# Patient Record
Sex: MALE | Race: WHITE
[De-identification: names, ages, dates, MRNs, and addresses within clinical notes are randomized per-mention and may not be internally consistent; named-entity substitution may affect disease eponyms.]

---

## 2018-03-15 ENCOUNTER — APPOINTMENT (OUTPATIENT)
Dept: PULMONOLOGY | Facility: CLINIC | Age: 43
End: 2018-03-15
Payer: COMMERCIAL

## 2018-03-15 VITALS
DIASTOLIC BLOOD PRESSURE: 70 MMHG | HEIGHT: 69 IN | BODY MASS INDEX: 32.58 KG/M2 | SYSTOLIC BLOOD PRESSURE: 120 MMHG | WEIGHT: 220 LBS

## 2018-03-15 VITALS — OXYGEN SATURATION: 95 % | HEART RATE: 71 BPM

## 2018-03-15 DIAGNOSIS — R05 COUGH: ICD-10-CM

## 2018-03-15 DIAGNOSIS — J45.909 UNSPECIFIED ASTHMA, UNCOMPLICATED: ICD-10-CM

## 2018-03-15 DIAGNOSIS — K21.9 GASTRO-ESOPHAGEAL REFLUX DISEASE W/OUT ESOPHAGITIS: ICD-10-CM

## 2018-03-15 DIAGNOSIS — Z82.49 FAMILY HISTORY OF ISCHEMIC HEART DISEASE AND OTHER DISEASES OF THE CIRCULATORY SYSTEM: ICD-10-CM

## 2018-03-15 DIAGNOSIS — G47.33 OBSTRUCTIVE SLEEP APNEA (ADULT) (PEDIATRIC): ICD-10-CM

## 2018-03-15 DIAGNOSIS — R06.09 OTHER FORMS OF DYSPNEA: ICD-10-CM

## 2018-03-15 PROCEDURE — 99205 OFFICE O/P NEW HI 60 MIN: CPT | Mod: 25

## 2018-03-15 PROCEDURE — 94010 BREATHING CAPACITY TEST: CPT

## 2018-03-15 RX ORDER — ALBUTEROL SULFATE 90 UG/1
108 (90 BASE) AEROSOL, METERED RESPIRATORY (INHALATION)
Refills: 0 | Status: ACTIVE | COMMUNITY

## 2018-03-15 RX ORDER — ESOMEPRAZOLE MAGNESIUM 20 MG/1
20 CAPSULE, DELAYED RELEASE ORAL
Refills: 0 | Status: ACTIVE | COMMUNITY

## 2020-03-20 ENCOUNTER — EMERGENCY (EMERGENCY)
Facility: HOSPITAL | Age: 45
LOS: 1 days | Discharge: HOME | End: 2020-03-20
Attending: STUDENT IN AN ORGANIZED HEALTH CARE EDUCATION/TRAINING PROGRAM
Payer: COMMERCIAL

## 2020-03-20 VITALS
WEIGHT: 220.9 LBS | SYSTOLIC BLOOD PRESSURE: 146 MMHG | RESPIRATION RATE: 18 BRPM | OXYGEN SATURATION: 96 % | HEIGHT: 70 IN | TEMPERATURE: 98 F | HEART RATE: 104 BPM | DIASTOLIC BLOOD PRESSURE: 86 MMHG

## 2020-03-20 PROCEDURE — 73110 X-RAY EXAM OF WRIST: CPT

## 2020-03-20 PROCEDURE — 70450 CT HEAD/BRAIN W/O DYE: CPT | Mod: 26

## 2020-03-20 PROCEDURE — 99284 EMERGENCY DEPT VISIT MOD MDM: CPT

## 2020-03-20 PROCEDURE — 73110 X-RAY EXAM OF WRIST: CPT | Mod: 26,LT

## 2020-03-20 PROCEDURE — 99284 EMERGENCY DEPT VISIT MOD MDM: CPT | Mod: 25

## 2020-03-20 PROCEDURE — 99053 MED SERV 10PM-8AM 24 HR FAC: CPT

## 2020-03-20 PROCEDURE — 70450 CT HEAD/BRAIN W/O DYE: CPT
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
## 2020-03-20 NOTE — ED PROVIDER NOTE - PATIENT PORTAL LINK FT
You can access the FollowMyHealth Patient Portal offered by [facility] by registering at the following website: [email or web address]. By joining [employer]’s FollowMyHealth portal, you will also be able to view your health information using other applications (apps) compatible with our system.

## 2020-03-20 NOTE — ED PROVIDER NOTE - CHPI ED SYMPTOMS NEG
no bruising/no numbness/no tingling/no abrasion/no back pain/no deformity/no difficulty bearing weight/no stiffness/no fever/no weakness
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
## 2020-04-25 ENCOUNTER — MESSAGE (OUTPATIENT)
Age: 45
End: 2020-04-25

## 2020-05-04 ENCOUNTER — APPOINTMENT (OUTPATIENT)
Dept: DISASTER EMERGENCY | Facility: HOSPITAL | Age: 45
End: 2020-05-04

## 2020-05-04 LAB
SARS-COV-2 IGG SERPL IA-ACNC: <0.1 INDEX
SARS-COV-2 IGG SERPL QL IA: NEGATIVE

## 2022-12-14 ENCOUNTER — ASC (OUTPATIENT)
Dept: URBAN - METROPOLITAN AREA SURGERY 8 | Facility: SURGERY | Age: 47
Setting detail: OPHTHALMOLOGY
End: 2022-12-14
Payer: COMMERCIAL

## 2022-12-14 DIAGNOSIS — H40.011: ICD-10-CM

## 2022-12-14 PROCEDURE — 65855 TRABECULOPLASTY LASER SURG: CPT | Performed by: OPHTHALMOLOGY

## 2022-12-14 ASSESSMENT — REFRACTION_CURRENTRX
OD_VPRISM_DIRECTION: PROGS
OS_VPRISM_DIRECTION: PROGS
OS_SPHERE: +0.25
OD_CYLINDER: -0.25
OD_OVR_VA: 20/
OD_ADD: +1.75
OD_SPHERE: +0.25
OS_AXIS: 008
OS_CYLINDER: -0.50
OD_AXIS: 049
OS_ADD: +1.75
OS_OVR_VA: 20/

## 2022-12-14 ASSESSMENT — PACHYMETRY
OD_CT_UM: 522
OD_CT_CORRECTION: 1
OS_CT_UM: 538
OS_CT_CORRECTION: 1

## 2022-12-14 ASSESSMENT — REFRACTION_AUTOREFRACTION
OD_AXIS: 076
OS_SPHERE: +0.25
OS_CYLINDER: -0.25
OS_AXIS: 015
OD_CYLINDER: -0.25
OD_SPHERE: PLANO

## 2022-12-14 ASSESSMENT — AXIALLENGTH_DERIVED: OS_AL: 24.0573

## 2022-12-14 ASSESSMENT — KERATOMETRY
OD_K1POWER_DIOPTERS: 42.25
OS_K1POWER_DIOPTERS: 41.75
OS_K2POWER_DIOPTERS: 42.50
OD_AXISANGLE_DEGREES: 095
OD_K2POWER_DIOPTERS: 42.50
OS_AXISANGLE_DEGREES: 082

## 2022-12-14 ASSESSMENT — CONFRONTATIONAL VISUAL FIELD TEST (CVF)
OS_FINDINGS: FULL
OD_FINDINGS: FULL

## 2022-12-14 ASSESSMENT — SPHEQUIV_DERIVED: OS_SPHEQUIV: 0.125

## 2022-12-14 ASSESSMENT — VISUAL ACUITY
OD_BCVA: 20/20
OS_BCVA: 20/20

## 2022-12-14 ASSESSMENT — TONOMETRY: OD_IOP_MMHG: 21

## 2022-12-21 ENCOUNTER — OFFICE (OUTPATIENT)
Dept: URBAN - METROPOLITAN AREA CLINIC 94 | Facility: CLINIC | Age: 47
Setting detail: OPHTHALMOLOGY
End: 2022-12-21
Payer: COMMERCIAL

## 2022-12-21 DIAGNOSIS — H40.1121: ICD-10-CM

## 2022-12-21 PROBLEM — H40.1111 POAG; RIGHT EYE MILD, LEFT EYE MILD: Status: ACTIVE | Noted: 2022-12-21

## 2022-12-21 PROCEDURE — 65855 TRABECULOPLASTY LASER SURG: CPT | Performed by: OPHTHALMOLOGY

## 2022-12-21 ASSESSMENT — REFRACTION_AUTOREFRACTION
OS_AXIS: 008
OD_AXIS: 037
OD_SPHERE: -0.25
OS_SPHERE: +0.50
OD_CYLINDER: -0.25
OS_CYLINDER: -0.50

## 2022-12-21 ASSESSMENT — SPHEQUIV_DERIVED
OD_SPHEQUIV: -0.375
OS_SPHEQUIV: 0.25

## 2022-12-21 ASSESSMENT — VISUAL ACUITY
OS_BCVA: 20/20
OD_BCVA: 20/20

## 2022-12-21 ASSESSMENT — PACHYMETRY
OD_CT_UM: 522
OD_CT_CORRECTION: 1
OS_CT_UM: 538
OS_CT_CORRECTION: 1

## 2022-12-21 ASSESSMENT — REFRACTION_CURRENTRX
OD_AXIS: 049
OD_OVR_VA: 20/
OD_SPHERE: +0.25
OD_VPRISM_DIRECTION: PROGS
OS_SPHERE: +0.25
OD_CYLINDER: -0.25
OS_ADD: +1.75
OS_VPRISM_DIRECTION: PROGS
OS_AXIS: 008
OS_OVR_VA: 20/
OS_CYLINDER: -0.50
OD_ADD: +1.75

## 2022-12-21 ASSESSMENT — KERATOMETRY
OS_K1POWER_DIOPTERS: 41.75
OD_K1POWER_DIOPTERS: 42.25
OD_K2POWER_DIOPTERS: 3.00
OS_K2POWER_DIOPTERS: 42.75
OS_AXISANGLE_DEGREES: 086
OD_AXISANGLE_DEGREES: 093

## 2022-12-21 ASSESSMENT — CONFRONTATIONAL VISUAL FIELD TEST (CVF)
OS_FINDINGS: FULL
OD_FINDINGS: FULL

## 2022-12-21 ASSESSMENT — TONOMETRY
OD_IOP_MMHG: 21
OS_IOP_MMHG: 20

## 2022-12-21 ASSESSMENT — AXIALLENGTH_DERIVED
OD_AL: 35.275
OS_AL: 23.9593

## 2023-01-24 ENCOUNTER — OFFICE (OUTPATIENT)
Dept: URBAN - METROPOLITAN AREA CLINIC 114 | Facility: CLINIC | Age: 48
Setting detail: OPHTHALMOLOGY
End: 2023-01-24
Payer: COMMERCIAL

## 2023-01-24 DIAGNOSIS — H40.1131: ICD-10-CM

## 2023-01-24 PROCEDURE — 92012 INTRM OPH EXAM EST PATIENT: CPT | Performed by: OPHTHALMOLOGY

## 2023-01-24 PROCEDURE — 92250 FUNDUS PHOTOGRAPHY W/I&R: CPT | Performed by: OPHTHALMOLOGY

## 2023-01-24 ASSESSMENT — REFRACTION_CURRENTRX
OD_OVR_VA: 20/
OD_SPHERE: +0.25
OD_ADD: +1.75
OS_CYLINDER: -0.50
OD_AXIS: 049
OS_AXIS: 008
OD_VPRISM_DIRECTION: PROGS
OS_SPHERE: +0.25
OS_OVR_VA: 20/
OD_CYLINDER: -0.25
OS_ADD: +1.75
OS_VPRISM_DIRECTION: PROGS

## 2023-01-24 ASSESSMENT — SPHEQUIV_DERIVED
OD_SPHEQUIV: -0.375
OS_SPHEQUIV: 0

## 2023-01-24 ASSESSMENT — REFRACTION_AUTOREFRACTION
OS_SPHERE: +0.25
OS_AXIS: 179
OD_AXIS: 027
OD_SPHERE: -0.25
OS_CYLINDER: -0.50
OD_CYLINDER: -0.25

## 2023-01-24 ASSESSMENT — KERATOMETRY
OD_AXISANGLE_DEGREES: 096
OS_K2POWER_DIOPTERS: 42.50
OS_AXISANGLE_DEGREES: 078
OS_K1POWER_DIOPTERS: 42.00
OD_K2POWER_DIOPTERS: 42.50
OD_K1POWER_DIOPTERS: 42.00

## 2023-01-24 ASSESSMENT — CONFRONTATIONAL VISUAL FIELD TEST (CVF)
OD_FINDINGS: FULL
OS_FINDINGS: FULL

## 2023-01-24 ASSESSMENT — AXIALLENGTH_DERIVED
OS_AL: 24.0602
OD_AL: 24.2131

## 2023-01-24 ASSESSMENT — TONOMETRY
OD_IOP_MMHG: 18
OS_IOP_MMHG: 18

## 2023-01-24 ASSESSMENT — PACHYMETRY
OS_CT_UM: 538
OD_CT_CORRECTION: 1
OD_CT_UM: 522
OS_CT_CORRECTION: 1

## 2023-01-24 ASSESSMENT — VISUAL ACUITY
OS_BCVA: 20/20-1
OD_BCVA: 20/25+1

## 2023-05-19 ENCOUNTER — OFFICE (OUTPATIENT)
Dept: URBAN - METROPOLITAN AREA CLINIC 94 | Facility: CLINIC | Age: 48
Setting detail: OPHTHALMOLOGY
End: 2023-05-19
Payer: COMMERCIAL

## 2023-05-19 DIAGNOSIS — H40.1131: ICD-10-CM

## 2023-05-19 PROCEDURE — 92012 INTRM OPH EXAM EST PATIENT: CPT | Performed by: OPHTHALMOLOGY

## 2023-05-19 PROCEDURE — 92083 EXTENDED VISUAL FIELD XM: CPT | Performed by: OPHTHALMOLOGY

## 2023-05-19 ASSESSMENT — REFRACTION_CURRENTRX
OS_SPHERE: +0.25
OS_AXIS: 008
OS_CYLINDER: -0.50
OD_VPRISM_DIRECTION: PROGS
OS_VPRISM_DIRECTION: PROGS
OD_OVR_VA: 20/
OS_OVR_VA: 20/
OD_ADD: +1.75
OD_SPHERE: +0.25
OD_CYLINDER: -0.25
OS_ADD: +1.75
OD_AXIS: 049

## 2023-05-19 ASSESSMENT — KERATOMETRY
OS_AXISANGLE_DEGREES: 085
OD_K2POWER_DIOPTERS: 43.00
OS_K1POWER_DIOPTERS: 41.75
OD_AXISANGLE_DEGREES: 093
OD_K1POWER_DIOPTERS: 42.25
OS_K2POWER_DIOPTERS: 42.75

## 2023-05-19 ASSESSMENT — TONOMETRY
OS_IOP_MMHG: 18
OD_IOP_MMHG: 12
OS_IOP_MMHG: 14
OD_IOP_MMHG: 18

## 2023-05-19 ASSESSMENT — VISUAL ACUITY
OS_BCVA: 20/20
OD_BCVA: 20/20

## 2023-05-19 ASSESSMENT — PACHYMETRY
OD_CT_UM: 522
OS_CT_UM: 538
OS_CT_CORRECTION: 1
OD_CT_CORRECTION: 1

## 2023-05-19 ASSESSMENT — REFRACTION_AUTOREFRACTION
OS_AXIS: 005
OD_AXIS: 041
OD_SPHERE: -0.25
OS_SPHERE: +0.50
OD_CYLINDER: -0.25
OS_CYLINDER: -0.75

## 2023-05-19 ASSESSMENT — SPHEQUIV_DERIVED
OS_SPHEQUIV: 0.125
OD_SPHEQUIV: -0.375

## 2023-05-19 ASSESSMENT — CONFRONTATIONAL VISUAL FIELD TEST (CVF)
OS_FINDINGS: FULL
OD_FINDINGS: FULL

## 2023-05-19 ASSESSMENT — AXIALLENGTH_DERIVED
OS_AL: 24.0096
OD_AL: 24.0689